# Patient Record
Sex: FEMALE | Race: WHITE | NOT HISPANIC OR LATINO | Employment: OTHER
[De-identification: names, ages, dates, MRNs, and addresses within clinical notes are randomized per-mention and may not be internally consistent; named-entity substitution may affect disease eponyms.]

---

## 2017-06-24 ENCOUNTER — HEALTH MAINTENANCE LETTER (OUTPATIENT)
Age: 62
End: 2017-06-24

## 2018-01-25 ENCOUNTER — DOCUMENTATION ONLY (OUTPATIENT)
Dept: FAMILY MEDICINE | Facility: OTHER | Age: 63
End: 2018-01-25

## 2018-01-25 RX ORDER — HYDROCODONE BITARTRATE AND ACETAMINOPHEN 5; 325 MG/1; MG/1
1 TABLET ORAL EVERY 4 HOURS PRN
COMMUNITY
Start: 2016-09-15 | End: 2018-05-31

## 2018-01-25 RX ORDER — METHYLPREDNISOLONE 4 MG
TABLET, DOSE PACK ORAL
COMMUNITY
Start: 2016-09-13 | End: 2018-05-31

## 2018-01-25 RX ORDER — ESTRADIOL 0.1 MG/G
CREAM VAGINAL AT BEDTIME
COMMUNITY
Start: 2015-07-27 | End: 2018-05-31

## 2018-01-25 RX ORDER — ALPRAZOLAM 0.5 MG
TABLET ORAL
COMMUNITY
Start: 2016-09-15 | End: 2018-05-31

## 2018-01-25 RX ORDER — ONDANSETRON 4 MG/1
4 TABLET, ORALLY DISINTEGRATING ORAL EVERY 6 HOURS PRN
COMMUNITY
Start: 2016-09-13 | End: 2018-05-31

## 2018-05-31 ENCOUNTER — OFFICE VISIT (OUTPATIENT)
Dept: FAMILY MEDICINE | Facility: OTHER | Age: 63
End: 2018-05-31
Attending: FAMILY MEDICINE
Payer: COMMERCIAL

## 2018-05-31 VITALS
OXYGEN SATURATION: 98 % | HEIGHT: 61 IN | TEMPERATURE: 98.2 F | HEART RATE: 82 BPM | BODY MASS INDEX: 22.98 KG/M2 | SYSTOLIC BLOOD PRESSURE: 128 MMHG | DIASTOLIC BLOOD PRESSURE: 78 MMHG | WEIGHT: 121.7 LBS

## 2018-05-31 DIAGNOSIS — R05.9 COUGH: Primary | ICD-10-CM

## 2018-05-31 PROCEDURE — 99213 OFFICE O/P EST LOW 20 MIN: CPT | Performed by: FAMILY MEDICINE

## 2018-05-31 RX ORDER — AZITHROMYCIN 250 MG/1
TABLET, FILM COATED ORAL
Qty: 6 TABLET | Refills: 0 | Status: SHIPPED | OUTPATIENT
Start: 2018-05-31 | End: 2020-09-24

## 2018-05-31 ASSESSMENT — PAIN SCALES - GENERAL: PAINLEVEL: NO PAIN (0)

## 2018-05-31 NOTE — NURSING NOTE
COUGH/CONGESTION  Onset:  sunday  Fever:  Feeling of low grade  Productive:  no  Shortness of breath:  no  Chills:  no  Pain scale:   no   Activity Level:  low  Appetite:  low  Able to sleep:  yes  Alisa Mancuso LPN .............5/31/2018  11:03 AM

## 2018-05-31 NOTE — PROGRESS NOTES
"SUBJECTIVE:  Jenae Jones is a 63 year old female here for productive cough and sore throat.  This has been present for 7 days.  She has a grandchild coming any day now.  She has felt feverish.  No rhinorrhea, ear pain, diarrhea, rash.  No sick contacts.  Has been using OTC meds without any help.    Allergies:  Allergies   Allergen Reactions     No Clinical Screening - See Comments      Decongestants  Dial soap-hives       ROS:    As above otherwise ROS is unremarkable.    OBJECTIVE:  /78 (BP Location: Left arm, Patient Position: Sitting, Cuff Size: Adult Regular)  Pulse 82  Temp 98.2  F (36.8  C) (Tympanic)  Ht 5' 0.63\" (1.54 m)  Wt 121 lb 11.2 oz (55.2 kg)  SpO2 98%  Breastfeeding? No  BMI 23.28 kg/m2    EXAM:  General Appearance: Pleasant, alert, appropriate appearance for age. No acute distress  Head: Normal. Normocephalic, atraumatic.  Eyes: PERRL, EOMI  Ears: Normal TM's bilaterally. Normal auditory canals and external ears.   OroPharynx: Dental hygiene adequate. Normal buccal mucosa. Normal pharynx.  Neck: Supple, no masses or nodes, no lymphadenopathy.  No thyromegaly.  Lungs: Normal chest wall and respirations. Clear to auscultation, no wheezes or crackles.  Skin: no concerning or new rashes.    ASSESSEMENT AND PLAN:    1. Cough      Likely viral but with her upcoming grandchild will treat with azithromycin.  Discussed this is not standard and she is ok with that.  Discussed conservative care.  Follow up if worsening.    Andrew Wilkerson MD    This document was prepared using voice generated software.  While every attempt was made for accuracy, grammatical errors may exist.  "

## 2018-05-31 NOTE — MR AVS SNAPSHOT
"              After Visit Summary   5/31/2018    Jenae Jones    MRN: [id number]           Patient Information     Date Of Birth          1955        Visit Information        Provider Department      5/31/2018 11:15 AM Panfilo Wilkerson MD [facility]        Today's Diagnoses     Cough    -  1       Follow-ups after your visit        Who to contact     If you have questions or need follow up information about today's clinic visit or your schedule please contact [facility] [place] directly at [phone].  Normal or non-critical lab and imaging results will be communicated to you by Monet Software[employer], letter or phone within 4 business days after the clinic has received the results. If you do not hear from us within 7 days, please contact the clinic through BlackbookHR or phone. If you have a critical or abnormal lab result, we will notify you by phone as soon as possible.  Submit refill requests through BlackbookHR or call your pharmacy and they will forward the refill request to us. Please allow 3 business days for your refill to be completed.          Additional Information About Your Visit        MyChart Information     Blackbook[employer] gives you secure access to your electronic health record. If you see a primary care provider, you can also send messages to your care team and make appointments. If you have questions, please call your primary care clinic.  If you do not have a primary care provider, please call [phone] and they will assist you.        Care EveryWhere ID     This is your Care EveryWhere ID. This could be used by other organizations to access your [facility] medical records  [id number]        Your Vitals Were     Pulse Temperature Height Pulse Oximetry Breastfeeding? BMI (Body Mass Index)    82 98.2  F (36.8  C) (Tympanic) 5' 0.63\" (1.54 m) 98% No 23.28 kg/m2       Blood Pressure from Last 3 Encounters:   05/31/18 128/78   09/13/16 145/89   08/13/14 122/78    Weight from Last 3 " Encounters:   05/31/18 121 lb 11.2 oz (55.2 kg)   09/13/16 115 lb (52.2 kg)   08/13/14 118 lb 2 oz (53.6 kg)              Today, you had the following     No orders found for display         Today's Medication Changes          These changes are accurate as of 5/31/18 11:18 AM.  If you have any questions, ask your nurse or doctor.               Start taking these medicines.        Dose/Directions    azithromycin 250 MG tablet   Commonly known as:  ZITHROMAX   Used for:  Cough   Started by:  Panfilo Wilkerson MD        Two tablets first day, then one tablet daily for four days.   Quantity:  6 tablet   Refills:  0            Where to get your medicines      These medications were sent to [facility] [address] [place], [place] - [address], [facility] [address]     Phone:  [phone]     azithromycin 250 MG tablet                Primary Care Provider Office Phone # Fax #    Cecil Lindquist -[phone]       [address] [address]        Equal Access to Services     [facility] AH: Hadii sandrita ku hadasho Soomaali, waaxda luqadaha, qaybta kaalmada adeegyada, sonia almodovar . So [facility] [phone].    ATENCIÓN: Si habla español, tiene a spring disposición servicios gratuitos de asistencia lingüística. [facility] [phone].    We comply with applicable federal civil rights laws and [place] laws. We do not discriminate on the basis of race, color, national origin, age, disability, sex, sexual orientation, or gender identity.            Thank you!     Thank you for choosing [facility] AND [facility]  for your care. Our goal is always to provide you with excellent care. Hearing back from our patients is one way we can continue to improve our services. Please take a few minutes to complete the written survey that you may receive in the mail after your visit with us. Thank you!              Your Updated Medication List - Protect others around you: Learn how to safely use, store and throw away your medicines at www.disposemymeds.org.          This list is accurate as of 5/31/18 11:18 AM.  Always use your most recent med list.                   Brand Name Dispense Instructions for use Diagnosis    aspirin-acetaminophen-caffeine 250-250-65 MG per tablet    EXCEDRIN MIGRAINE     Take 1 tablet by mouth every 6 hours as needed For headache. Max acetaminophen dose: 4000 mg in 24 hours.        azithromycin 250 MG tablet    ZITHROMAX    6 tablet    Two tablets first day, then one tablet daily for four days.    Cough

## 2018-06-11 ENCOUNTER — OFFICE VISIT (OUTPATIENT)
Dept: FAMILY MEDICINE | Facility: OTHER | Age: 63
End: 2018-06-11
Attending: NURSE PRACTITIONER
Payer: COMMERCIAL

## 2018-06-11 ENCOUNTER — HOSPITAL ENCOUNTER (OUTPATIENT)
Dept: GENERAL RADIOLOGY | Facility: OTHER | Age: 63
Discharge: HOME | End: 2018-06-11
Attending: NURSE PRACTITIONER | Admitting: NURSE PRACTITIONER
Payer: COMMERCIAL

## 2018-06-11 VITALS
BODY MASS INDEX: 24.03 KG/M2 | HEIGHT: 60 IN | WEIGHT: 122.4 LBS | HEART RATE: 80 BPM | DIASTOLIC BLOOD PRESSURE: 80 MMHG | SYSTOLIC BLOOD PRESSURE: 102 MMHG

## 2018-06-11 DIAGNOSIS — R09.89 CHEST CONGESTION: ICD-10-CM

## 2018-06-11 DIAGNOSIS — G93.31 POST VIRAL SYNDROME: ICD-10-CM

## 2018-06-11 DIAGNOSIS — R09.89 CHEST CONGESTION: Primary | ICD-10-CM

## 2018-06-11 PROCEDURE — 99213 OFFICE O/P EST LOW 20 MIN: CPT | Performed by: NURSE PRACTITIONER

## 2018-06-11 PROCEDURE — 71046 X-RAY EXAM CHEST 2 VIEWS: CPT

## 2018-06-11 ASSESSMENT — ANXIETY QUESTIONNAIRES
1. FEELING NERVOUS, ANXIOUS, OR ON EDGE: NOT AT ALL
2. NOT BEING ABLE TO STOP OR CONTROL WORRYING: NOT AT ALL

## 2018-06-11 ASSESSMENT — PAIN SCALES - GENERAL: PAINLEVEL: NO PAIN (1)

## 2018-06-11 NOTE — NURSING NOTE
Patient presents to the clinic for a follow up regarding a cold. Patient states she came in 3 weeks ago and was put on antibiotics and it did not help.  Julio C Benavides ..............6/11/2018 11:04 AM

## 2018-06-11 NOTE — PATIENT INSTRUCTIONS
ASSESSMENT/PLAN:     1. Chest congestion  - XR Chest 2 Views; Future    2. Post viral syndrome  - Discussed that although azithromycin is only 5 days it does continue working in the body for another 5 days.   - Exam and CXR not indicative of treatment with antibiotics.   -- Symptomatic treatments recommended.  - Ensure you are staying hydrated by drinking plenty of fluids or eating foods such as popsicles, jello, pudding.  - Honey and Salt water gurgles can help soothe sore throat  - Rest  - Humidifier can help with congestion and help keep mucus membranes such as throat and nose from drying out.  - Sleeping slightly propped up can help with congestion and postnasal drainage that can worsen cough at bedtime.  - As long as you have never been told to take Tylenol and/or Ibuprofen you can use them to manage fever and body aches per package instructions  Make sure you eat when you take ibuprofen to avoid stomach upset.  - OTC cough medications per package instructions to help with cough. Check to see if the cough/cold medication already has acetaminophen (Tylenol) in it. If it does avoid taking additional Tylenol.  - Can try OTC antihistamine such as Zyrtec, Allegra, Claritin (generic okay) to help with congestion, sinus pressure. Benadryl as needed at night time.   - Can continue with Mucinex to help thin secretions.         FUTURE APPOINTMENTS:       - Follow-up visit - If sudden onset of new fever, worsening symptoms return for further evaluation.    Anum Rodriguez, CNP  [facility] AND [facility]

## 2018-06-11 NOTE — MR AVS SNAPSHOT
After Visit Summary   6/11/2018    Jenae Jones    MRN: [id number]           Patient Information     Date Of Birth          1955        Visit Information        Provider Department      6/11/2018 11:00 AM Anum Rodriguez CNP [facility]        Today's Diagnoses     Chest congestion    -  1    Post viral syndrome          Care Instructions    ASSESSMENT/PLAN:     1. Chest congestion  - XR Chest 2 Views; Future    2. Post viral syndrome  - Discussed that although azithromycin is only 5 days it does continue working in the body for another 5 days.   - Exam and CXR not indicative of treatment with antibiotics.   -- Symptomatic treatments recommended.  - Ensure you are staying hydrated by drinking plenty of fluids or eating foods such as popsicles, jello, pudding.  - Honey and Salt water gurgles can help soothe sore throat  - Rest  - Humidifier can help with congestion and help keep mucus membranes such as throat and nose from drying out.  - Sleeping slightly propped up can help with congestion and postnasal drainage that can worsen cough at bedtime.  - As long as you have never been told to take Tylenol and/or Ibuprofen you can use them to manage fever and body aches per package instructions  Make sure you eat when you take ibuprofen to avoid stomach upset.  - OTC cough medications per package instructions to help with cough. Check to see if the cough/cold medication already has acetaminophen (Tylenol) in it. If it does avoid taking additional Tylenol.  - Can try OTC antihistamine such as Zyrtec, Allegra, Claritin (generic okay) to help with congestion, sinus pressure. Benadryl as needed at night time.   - Can continue with Mucinex to help thin secretions.         FUTURE APPOINTMENTS:       - Follow-up visit - If sudden onset of new fever, worsening symptoms return for further evaluation.    Anum Rodriguez CNP  [facility] AND hospitals            Follow-ups after  your visit        Future tests that were ordered for you today     Open Future Orders        Priority Expected Expires Ordered    XR Chest 2 Views Routine 6/11/2018 6/11/2019 6/11/2018            Who to contact     If you have questions or need follow up information about today's clinic visit or your schedule please contact [facility] directly at [phone].  Normal or non-critical lab and imaging results will be communicated to you by MerchMehart, letter or phone within 4 business days after the clinic has received the results. If you do not hear from us within 7 days, please contact the clinic through MerchMehart or phone. If you have a critical or abnormal lab result, we will notify you by phone as soon as possible.  Submit refill requests through [employer] or call your pharmacy and they will forward the refill request to us. Please allow 3 business days for your refill to be completed.          Additional Information About Your Visit        MerchMehart Information     [employer] gives you secure access to your electronic health record. If you see a primary care provider, you can also send messages to your care team and make appointments. If you have questions, please call your primary care clinic.  If you do not have a primary care provider, please call [phone] and they will assist you.        Care EveryWhere ID     This is your Care EveryWhere ID. This could be used by other organizations to access your [facility] medical records  [id number]        Your Vitals Were     Pulse Height BMI (Body Mass Index)             80 5' (1.524 m) 23.9 kg/m2          Blood Pressure from Last 3 Encounters:   06/11/18 102/80   05/31/18 128/78   09/13/16 145/89    Weight from Last 3 Encounters:   06/11/18 122 lb 6.4 oz (55.5 kg)   05/31/18 121 lb 11.2 oz (55.2 kg)   09/13/16 115 lb (52.2 kg)               Primary Care Provider Office Phone # Fax #    Cecil Lindquist [phone]       [employer]  [place] [address] [facility] [address]        Equal Access to Services     KARSONMIMA MAXWELL : Hadii aad ku hadhui Palomares, wagopalda luqadaha, qaybta kaestrellada carrollkimberlyani, waxnini shayy jessilvina sotomayorjoetoyin olsen. So [facility] [phone].    ATENCIÓN: Si habla español, tiene a spring disposición servicios gratuitos de asistencia lingüística. Llame al [phone].    We comply with applicable federal civil rights laws and [place] laws. We do not discriminate on the basis of race, color, national origin, age, disability, sex, sexual orientation, or gender identity.            Thank you!     Thank you for choosing [facility] AND [facility]  for your care. Our goal is always to provide you with excellent care. Hearing back from our patients is one way we can continue to improve our services. Please take a few minutes to complete the written survey that you may receive in the mail after your visit with us. Thank you!             Your Updated Medication List - Protect others around you: Learn how to safely use, store and throw away your medicines at www.disposemymeds.org.          This list is accurate as of 6/11/18 11:39 AM.  Always use your most recent med list.                   Brand Name Dispense Instructions for use Diagnosis    aspirin-acetaminophen-caffeine 250-250-65 MG per tablet    EXCEDRIN MIGRAINE     Take 1 tablet by mouth every 6 hours as needed For headache. Max acetaminophen dose: 4000 mg in 24 hours.        azithromycin 250 MG tablet    ZITHROMAX    6 tablet    Two tablets first day, then one tablet daily for four days.    Cough

## 2018-06-11 NOTE — PROGRESS NOTES
"  SUBJECTIVE:   Jenae Jones is a 63 year old female who presents to clinic today for the following health issues:    Acute Illness   Acute illness concerns: Continues to have cold-like symptoms  Onset: about 2 weeks    Fever: no    Chills/Sweats: no    Headache (location?): no    Sinus Pressure:YES- started today    Conjunctivitis:  no    Ear Pain: YES: bilateral- started today    Rhinorrhea: YES- clear    Congestion: YES    Sore Throat: YES- scratchy     Cough: YES - \"a little bit\"    Wheeze: no    Decreased Appetite: no    Nausea: no    Vomiting: no    Diarrhea:  no    Dysuria/Freq.: no    Fatigue/Achiness: Yes    Sick/Strep Exposure: no     Therapies Tried and outcome: Robitussin helps for a few hours.     Was started on a z-pack at 2018 appointment due to arrival of her granddaughter. This has not helped.    Problem list and histories reviewed & adjusted, as indicated.  Additional history: as documented    Patient Active Problem List   Diagnosis     Facial pain     GERD (gastroesophageal reflux disease)     Hyperlipidemia, mixed     Menopause present     Mouth pain     Osteopenia     Palpitations     Routine health maintenance     Past Surgical History:   Procedure Laterality Date      SECTION      No Comments Provided     COLONOSCOPY      2007,normal;  Dr. Go outside facility     EXCISE CYST GENERIC (LOCATION)      ,endocervial region polyp     OTHER SURGICAL HISTORY      96481.0,SKIN/SUBCUTANEOUS SURGERY,3 birthmarks removed from neck.     OTHER SURGICAL HISTORY      [id number],SKIN BIOPSY,removal in [place]       Social History   Substance Use Topics     Smoking status: Former Smoker     Quit date: 1979     Smokeless tobacco: Never Used     Alcohol use 0.0 - 0.5 oz/week      Comment: socially     Family History   Problem Relation Age of Onset     CANCER Father      Cancer,leukemia     Other - See Comments Mother 70     Parkinson's/Psychiatric illness,Depression     Other - See " Comments Brother 50     Antoine's     Other - See Comments Brother      Psychiatric illness,Depression     Other - See Comments Daughter      Psychiatric illness,Depression     Breast Cancer No family hx of      Cancer-breast         Current Outpatient Prescriptions   Medication Sig Dispense Refill     aspirin-acetaminophen-caffeine (EXCEDRIN MIGRAINE) 250-250-65 MG per tablet Take 1 tablet by mouth every 6 hours as needed For headache. Max acetaminophen dose: 4000 mg in 24 hours.       azithromycin (ZITHROMAX) 250 MG tablet Two tablets first day, then one tablet daily for four days. 6 tablet 0     Allergies   Allergen Reactions     No Clinical Screening - See Comments      Decongestants  Dial soap-hives     Recent Labs   Lab Test  09/13/16   1125  08/14/14   0959  06/26/13   1636   LDL   --   145*   --    HDL   --   66   --    TRIG   --   69   --    CR  0.62   --   0.67   GFRESTIMATED  >90  Non  GFR Calc     --    --    GFRESTBLACK  >90   GFR Calc     --   >60   POTASSIUM  3.5   --   4.4      BP Readings from Last 3 Encounters:   06/11/18 102/80   05/31/18 128/78   09/13/16 145/89    Wt Readings from Last 3 Encounters:   06/11/18 122 lb 6.4 oz (55.5 kg)   05/31/18 121 lb 11.2 oz (55.2 kg)   09/13/16 115 lb (52.2 kg)           Reviewed and updated as needed this visit by clinical staff  Tobacco  Allergies  Meds  Med Hx  Surg Hx  Fam Hx  Soc Hx      Reviewed and updated as needed this visit by Provider         ROS:  Constitutional, HEENT, cardiovascular, pulmonary, gi and gu systems are negative, except as otherwise noted.    OBJECTIVE:     /80 (BP Location: Right arm, Patient Position: Sitting, Cuff Size: Adult Large)  Pulse 80  Ht 5' (1.524 m)  Wt 122 lb 6.4 oz (55.5 kg)  BMI 23.9 kg/m2  Body mass index is 23.9 kg/(m^2).     GENERAL: healthy, alert and no distress  EYES: Eyes grossly normal to inspection, PERRL and conjunctivae and sclerae normal  HENT: ear canals  normal, clear fluid behind both TMs: no erythema or exudate, nose and mouth without ulcers or lesions. congested  NECK: no adenopathy, no asymmetry, masses, or scars and thyroid normal to palpation  RESP: lungs clear to auscultation - no rales, rhonchi or wheezes  CV: regular rate and rhythm, normal S1 S2, no S3 or S4, no murmur, click or rub  NEURO: Normal strength and tone, mentation intact and speech normal  PSYCH: mentation appears normal, affect normal/bright      ASSESSMENT/PLAN:     1. Chest congestion  - XR Chest 2 Views; Future: Normal results. Has calcified granuloma in left apex which she had in CXR from 2016 as well.    2. Post viral syndrome  - Discussed that although azithromycin is only 5 days it does continue working in the body for another 5 days.   - Exam and CXR not indicative of treatment with antibiotics.   -- Symptomatic treatments recommended.  - Ensure you are staying hydrated by drinking plenty of fluids or eating foods such as popsicles, jello, pudding.  - Honey and Salt water gurgles can help soothe sore throat  - Rest  - Humidifier can help with congestion and help keep mucus membranes such as throat and nose from drying out.  - Sleeping slightly propped up can help with congestion and postnasal drainage that can worsen cough at bedtime.  - As long as you have never been told to take Tylenol and/or Ibuprofen you can use them to manage fever and body aches per package instructions  Make sure you eat when you take ibuprofen to avoid stomach upset.  - OTC cough medications per package instructions to help with cough. Check to see if the cough/cold medication already has acetaminophen (Tylenol) in it. If it does avoid taking additional Tylenol.  - Can try OTC antihistamine such as Zyrtec, Allegra, Claritin (generic okay) to help with congestion, sinus pressure. Benadryl as needed at night time.   - Can continue with Mucinex to help thin secretions.         FUTURE APPOINTMENTS:       -  Follow-up visit - If sudden onset of new fever, worsening symptoms return for further evaluation.    Anum Rodriguez CNP  [facility] AND [facility]

## 2019-08-12 ENCOUNTER — HOSPITAL ENCOUNTER (OUTPATIENT)
Dept: BONE DENSITY | Facility: OTHER | Age: 64
Discharge: HOME | End: 2019-08-12
Attending: FAMILY MEDICINE | Admitting: FAMILY MEDICINE
Payer: COMMERCIAL

## 2019-08-12 ENCOUNTER — HOSPITAL ENCOUNTER (OUTPATIENT)
Dept: MAMMOGRAPHY | Facility: OTHER | Age: 64
End: 2019-08-12
Attending: FAMILY MEDICINE
Payer: COMMERCIAL

## 2019-08-12 DIAGNOSIS — M85.80 OSTEOPENIA: ICD-10-CM

## 2019-08-12 DIAGNOSIS — Z12.39 BREAST SCREENING, UNSPECIFIED: ICD-10-CM

## 2019-08-12 PROCEDURE — 77063 BREAST TOMOSYNTHESIS BI: CPT

## 2019-08-12 PROCEDURE — 77080 DXA BONE DENSITY AXIAL: CPT

## 2019-08-13 ENCOUNTER — HOSPITAL ENCOUNTER (OUTPATIENT)
Dept: MAMMOGRAPHY | Facility: OTHER | Age: 64
End: 2019-08-13
Attending: FAMILY MEDICINE
Payer: COMMERCIAL

## 2019-08-13 DIAGNOSIS — R92.8 ABNORMAL MAMMOGRAM: ICD-10-CM

## 2019-08-13 PROCEDURE — G0279 TOMOSYNTHESIS, MAMMO: HCPCS

## 2019-10-02 ENCOUNTER — HEALTH MAINTENANCE LETTER (OUTPATIENT)
Age: 64
End: 2019-10-02

## 2020-03-22 ENCOUNTER — HEALTH MAINTENANCE LETTER (OUTPATIENT)
Age: 65
End: 2020-03-22

## 2020-08-31 ENCOUNTER — OFFICE VISIT (OUTPATIENT)
Dept: FAMILY MEDICINE | Facility: OTHER | Age: 65
End: 2020-08-31
Attending: FAMILY MEDICINE
Payer: COMMERCIAL

## 2020-08-31 ENCOUNTER — HOSPITAL ENCOUNTER (OUTPATIENT)
Dept: GENERAL RADIOLOGY | Facility: OTHER | Age: 65
End: 2020-08-31
Attending: FAMILY MEDICINE
Payer: COMMERCIAL

## 2020-08-31 VITALS
OXYGEN SATURATION: 98 % | HEART RATE: 70 BPM | RESPIRATION RATE: 16 BRPM | SYSTOLIC BLOOD PRESSURE: 120 MMHG | WEIGHT: 128.6 LBS | DIASTOLIC BLOOD PRESSURE: 82 MMHG | TEMPERATURE: 98.5 F | BODY MASS INDEX: 25.12 KG/M2

## 2020-08-31 DIAGNOSIS — F41.0 PANIC ATTACK: ICD-10-CM

## 2020-08-31 DIAGNOSIS — Z12.11 SCREEN FOR COLON CANCER: ICD-10-CM

## 2020-08-31 DIAGNOSIS — M25.512 ACUTE PAIN OF LEFT SHOULDER: ICD-10-CM

## 2020-08-31 DIAGNOSIS — M75.82 ROTATOR CUFF TENDONITIS, LEFT: Primary | ICD-10-CM

## 2020-08-31 DIAGNOSIS — Z12.31 ENCOUNTER FOR SCREENING MAMMOGRAM FOR BREAST CANCER: ICD-10-CM

## 2020-08-31 PROCEDURE — 73030 X-RAY EXAM OF SHOULDER: CPT | Mod: LT

## 2020-08-31 PROCEDURE — G0463 HOSPITAL OUTPT CLINIC VISIT: HCPCS

## 2020-08-31 PROCEDURE — G0439 PPPS, SUBSEQ VISIT: HCPCS | Performed by: FAMILY MEDICINE

## 2020-08-31 RX ORDER — ALPRAZOLAM 0.5 MG
0.5 TABLET ORAL DAILY PRN
Qty: 6 TABLET | Refills: 0 | Status: SHIPPED | OUTPATIENT
Start: 2020-08-31 | End: 2020-09-06

## 2020-08-31 ASSESSMENT — PAIN SCALES - GENERAL: PAINLEVEL: EXTREME PAIN (9)

## 2020-08-31 NOTE — NURSING NOTE
Chief Complaint   Patient presents with     Pain     L shoulder      L shoulder pain for 3 weeks. Was grocery shopping and was going to lift something and instant pain. Minimal relief with advil and ice. Very poor ROM.     Medication Reconciliation: complete    Katelyn Joseph, LPN

## 2020-08-31 NOTE — LETTER
September 25, 2020      Jenae Jones  [address] [facility] [address]        Dear ,    We are writing to inform you of your test results.      Resulted Orders   ABSTRACT COLOGUARD-NO CHARGE   Result Value Ref Range    COLOGUARD-ABSTRACT Negative        If you have any questions or concerns, please call the clinic at the number listed above.       Sincerely,        Fauzia Estrada MD

## 2020-08-31 NOTE — PROGRESS NOTES
Nursing Notes:   Katelyn Joseph LPN  8/31/2020 11:19 AM  Signed  Chief Complaint   Patient presents with     Pain     L shoulder      L shoulder pain for 3 weeks. Was grocery shopping and was going to lift something and instant pain. Minimal relief with advil and ice. Very poor ROM.     Medication Reconciliation: complete    Katelyn Joseph LPN       SUBJECTIVE:  HPI:Jenae Jones is a 65 year old female here for acute left shoulder pain for the past 3 weeks.  She first noticed pain when she was at the grocery store reaching for an item and she heard and felt a small popping.  Since then she notes pain with movement which has been worse at night.  She rates pain 8/10 with movement.  She has been taking Advil 200 mg once or twice daily which has helped only a little.  She denies any surgeries or injuries to this shoulder previously.    She would also like to establish care at [facility] and transfer her care from the [facility], in order to be closer to home.  She is due for her mammogram and colonoscopy, which I will order today.  She states she is up-to-date on her shingles vaccine which we will update in our system to reflect that.  She will return to clinic for her annual exam and she will check when that is due.    Lastly, she reports that she has never had a history of depression and anxiety, but recently with the protests going on and police violence and COVID, she finds herself having panic attacks.  Her son is a  in the [place] and she is often worried about him.  She denies any alcohol or drug use in the past.  She was prescribed a daily SSRI by her PCP in the [facility], but feels no symptoms of depression and prefers not to take medication daily unless it is truly indicated.    Allergies:  Allergies   Allergen Reactions     No Clinical Screening - See Comments      Decongestants  Dial soap-hives     ROS:  Constitutional, HEENT, cardiovascular, pulmonary, GI and  systems are negative,  except as otherwise noted.    Past medical, surgical, and family history reviewed and updated as appropriate in the chart.  Relevant social history listed in HPI.    OBJECTIVE:  /82 (BP Location: Right arm, Patient Position: Sitting, Cuff Size: Adult Regular)   Pulse 70   Temp 98.5  F (36.9  C) (Tympanic)   Resp 16   Wt 58.3 kg (128 lb 9.6 oz)   SpO2 98%   Breastfeeding No   BMI 25.12 kg/m      EXAM:  Constitutional: Alert and oriented. No acute distress. Well-groomed, well-hydrated and well-nourished.  Appears stated age.  Head: Normocephalic, atraumatic.  Eyes: PERRL, EOMI  Shoulder exam: Left shoulder-no bony deformities.  Painful to palpation along supraspinatus and teres minor in addition to the biceps insertion anteriorly.  Limited range of motion with posterior extension.  AB duction is preserved. Rotator cuff/supraspinatous 4/5 strength.  With resisted external rotation has some mild pain in the right posterior shoulder/supraspinatous area.  Left shoulder is unremarkable.  Skin: Warm, dry, intact.  No concerning rashes or lesions.  Neurologic: CN 2-12 grossly intact. Normal gait. No focal motor or sensory deficits. No tremor.  Psychiatric: Does not appear anxious or depressed.  Appropriate affect and insight.    X-ray of left shoulder personally reviewed.  FINDINGS:  No fracture or dislocation is identified. Mild degenerative  changes are seen at the acromioclavicular jointCalcified nodules are  seen in the left upper lobe. There is at least one calcified left  hilar lymph node. These are almost certainly benign.    Left upper lung calcification has not changed since June 2018.    ASSESSEMENT AND PLAN:    1. Rotator cuff tendonitis, left  2. Acute pain of left shoulder  - PHYSICAL THERAPY REFERRAL; Future  -Patient is in physical therapy at [facility] and would like to return there for PT.  -Increased dose of Advil/ibuprofen to a max of 800 mg every 8 hours.  -Encouraged ice and frequent gentle  shoulder movement to prevent frozen shoulder.  -Return to clinic if no improvement with physical therapy after 6 weeks.  I would consider MRI imaging at that point.    3. Encounter for screening mammogram for breast cancer  - MA Screen Bilateral w/Randy; Future    4. Screen for colon cancer  - ABSTRACT COLOGUARD-NO CHARGE    5. Panic attack  -Due to recent stress, we discussed a trial of a short acting benzodiazepine, which she can break in half if needed.  -Patient denies history of alcohol abuse or drug abuse.  -If symptoms worsen or persist, we discussed a trial of a daily SSRI.  - ALPRAZolam (XANAX) 0.5 MG tablet; Take 1 tablet (0.5 mg) by mouth daily as needed for anxiety  Dispense: 6 tablet; Refill: 0    Return to clinic for annual wellness exam.    Fauzia Estrada MD  [facility] AND [facility]

## 2020-08-31 NOTE — PATIENT INSTRUCTIONS
Patient Education     Understanding Rotator Cuff Tendonitis    Tendons are tough tissues that connect muscles to bone. A group of 4 muscles and their tendons form a  cuff  around the head of the upper arm bone. This is called the rotator cuff. It connects the upper arm to the shoulder blade. It gives the shoulder joint stability and strength.  If tendons are injured or strained, they may become irritated and swollen (inflamed). This is called tendonitis. Rotator cuff tendonitis may cause shoulder pain and loss of function.  What causes rotator cuff tendonitis?  Tendonitis results when the rotator cuff tendons are injured or overworked. The most common cause of injury is repetitive overhead activities. These can be work-related activities such as reaching, pushing, or lifting. Or they can be sports-related activities such as throwing, swimming, or lifting weights.  Symptoms of rotator cuff tendonitis  Pain on the side of the upper arm is the most common symptom. Pain may get worse with overhead movements or when you raise the arm above shoulder level. It may also hurt to lie on the shoulder at night.  Treatment for rotator cuff tendonitis  Treatment may include the following:    Active rest. This lets the rotator cuff heal. Active rest means using your arm and shoulder, but avoiding activities that cause pain, such as reaching overhead or sleeping on the shoulder.    Cold packs. Putting ice packs on the shoulder helps reduce swelling and relieve pain.    Pain medicines. Prescription or over-the-counter pain medicines can help relieve pain and swelling.    Arm and shoulder exercises. These help keep the shoulder joint mobile as it heals. They also help improve the strength of muscles around the joint.  Possible complications  It might be tempting to stop using your shoulder completely to avoid pain. But doing so may lead to a condition called  frozen shoulder.  To help prevent this, following instructions you are  given for active rest and for doing exercises to help your shoulder heal.  When to call your healthcare provider  Call your healthcare provider right away if you have any of these:    Fever of 100.4 F (38 C) or higher, or as directed    Symptoms that don t get better, or get worse    New symptoms   Date Last Reviewed: 3/10/2016    8806-0695 [employer]. [address] [place] [address]. All rights reserved. This information is not intended as a substitute for professional medical care. Always follow your healthcare professional's instructions.

## 2020-09-17 LAB — COLOGUARD-ABSTRACT: NEGATIVE

## 2020-09-24 ENCOUNTER — MYC MEDICAL ADVICE (OUTPATIENT)
Dept: FAMILY MEDICINE | Facility: OTHER | Age: 65
End: 2020-09-24

## 2020-09-24 DIAGNOSIS — M75.82 ROTATOR CUFF TENDONITIS, LEFT: Primary | ICD-10-CM

## 2020-09-24 DIAGNOSIS — M25.512 ACUTE PAIN OF LEFT SHOULDER: ICD-10-CM

## 2020-09-30 ENCOUNTER — TRANSFERRED RECORDS (OUTPATIENT)
Dept: HEALTH INFORMATION MANAGEMENT | Facility: OTHER | Age: 65
End: 2020-09-30

## 2020-10-01 ENCOUNTER — TELEPHONE (OUTPATIENT)
Dept: FAMILY MEDICINE | Facility: OTHER | Age: 65
End: 2020-10-01

## 2020-10-02 ENCOUNTER — MYC MEDICAL ADVICE (OUTPATIENT)
Dept: FAMILY MEDICINE | Facility: OTHER | Age: 65
End: 2020-10-02

## 2020-10-05 ENCOUNTER — TELEPHONE (OUTPATIENT)
Dept: FAMILY MEDICINE | Facility: OTHER | Age: 65
End: 2020-10-05

## 2020-10-05 DIAGNOSIS — M75.92 SUPRASPINATUS TENDONITIS, LEFT: ICD-10-CM

## 2020-10-05 DIAGNOSIS — M75.102 TEAR OF LEFT SUPRASPINATUS TENDON: Primary | ICD-10-CM

## 2020-10-05 RX ORDER — TRAMADOL HYDROCHLORIDE 50 MG/1
50 TABLET ORAL EVERY 12 HOURS PRN
Qty: 30 TABLET | Refills: 0 | Status: SHIPPED | OUTPATIENT
Start: 2020-10-05 | End: 2020-10-20

## 2020-10-05 NOTE — TELEPHONE ENCOUNTER
Patient called with MRI results. Partial thickness tear and tendonitis. Tramadol ordered to be used for severe pain, #30. She has also been using CBD oil and cream- risks/benefits discussed. RTC in 2-3 weeks if no improvement in pain. May need ortho referral if pain doesn't improve w/ PT. -CCN

## 2020-10-26 ENCOUNTER — MYC MEDICAL ADVICE (OUTPATIENT)
Dept: FAMILY MEDICINE | Facility: OTHER | Age: 65
End: 2020-10-26

## 2020-10-28 NOTE — TELEPHONE ENCOUNTER
MRI is in the chart under media. I'm not sure what she sees on her end in her mychart. Please ask Anum Mcintosh for help with this as I have tried to address.    -CCN

## 2020-11-09 ENCOUNTER — HOSPITAL ENCOUNTER (OUTPATIENT)
Dept: MAMMOGRAPHY | Facility: OTHER | Age: 65
Discharge: HOME | End: 2020-11-09
Attending: FAMILY MEDICINE | Admitting: FAMILY MEDICINE
Payer: COMMERCIAL

## 2020-11-09 DIAGNOSIS — Z12.31 ENCOUNTER FOR SCREENING MAMMOGRAM FOR BREAST CANCER: ICD-10-CM

## 2020-11-09 PROCEDURE — 77067 SCR MAMMO BI INCL CAD: CPT

## 2021-09-04 ENCOUNTER — HEALTH MAINTENANCE LETTER (OUTPATIENT)
Age: 66
End: 2021-09-04

## 2022-05-25 ENCOUNTER — OFFICE VISIT (OUTPATIENT)
Dept: FAMILY MEDICINE | Facility: OTHER | Age: 67
End: 2022-05-25
Attending: FAMILY MEDICINE
Payer: COMMERCIAL

## 2022-05-25 VITALS
TEMPERATURE: 97.6 F | SYSTOLIC BLOOD PRESSURE: 122 MMHG | HEART RATE: 85 BPM | WEIGHT: 130.8 LBS | DIASTOLIC BLOOD PRESSURE: 62 MMHG | OXYGEN SATURATION: 100 % | BODY MASS INDEX: 24.07 KG/M2 | RESPIRATION RATE: 16 BRPM | HEIGHT: 62 IN

## 2022-05-25 DIAGNOSIS — N95.2 VAGINAL ATROPHY: ICD-10-CM

## 2022-05-25 DIAGNOSIS — R73.01 IFG (IMPAIRED FASTING GLUCOSE): ICD-10-CM

## 2022-05-25 DIAGNOSIS — H90.6 MIXED HEARING LOSS, BILATERAL: ICD-10-CM

## 2022-05-25 DIAGNOSIS — Z11.59 NEED FOR HEPATITIS C SCREENING TEST: ICD-10-CM

## 2022-05-25 DIAGNOSIS — E78.5 DYSLIPIDEMIA: ICD-10-CM

## 2022-05-25 DIAGNOSIS — M81.0 OSTEOPOROSIS, UNSPECIFIED OSTEOPOROSIS TYPE, UNSPECIFIED PATHOLOGICAL FRACTURE PRESENCE: ICD-10-CM

## 2022-05-25 DIAGNOSIS — Z00.00 HEALTH CARE MAINTENANCE: Primary | ICD-10-CM

## 2022-05-25 DIAGNOSIS — C44.42 SQUAMOUS CELL CARCINOMA OF SCALP: ICD-10-CM

## 2022-05-25 DIAGNOSIS — Z12.31 ENCOUNTER FOR SCREENING MAMMOGRAM FOR BREAST CANCER: ICD-10-CM

## 2022-05-25 PROBLEM — M75.92 SUPRASPINATUS TENDONITIS, LEFT: Status: RESOLVED | Noted: 2020-10-05 | Resolved: 2022-05-25

## 2022-05-25 PROBLEM — M75.102 TEAR OF LEFT SUPRASPINATUS TENDON: Status: RESOLVED | Noted: 2020-10-05 | Resolved: 2022-05-25

## 2022-05-25 LAB
CA-I BLD-MCNC: 5 MG/DL (ref 4.4–5.2)
CHOLEST SERPL-MCNC: 224 MG/DL
DEPRECATED CALCIDIOL+CALCIFEROL SERPL-MC: 42 UG/L (ref 30–100)
FASTING STATUS PATIENT QL REPORTED: ABNORMAL
HDLC SERPL-MCNC: 84 MG/DL (ref 23–92)
LDLC SERPL CALC-MCNC: 126 MG/DL
NONHDLC SERPL-MCNC: 140 MG/DL
PTH-INTACT SERPL-MCNC: 33 PG/ML (ref 18–80)
TRIGL SERPL-MCNC: 72 MG/DL

## 2022-05-25 PROCEDURE — G0463 HOSPITAL OUTPT CLINIC VISIT: HCPCS

## 2022-05-25 PROCEDURE — 82330 ASSAY OF CALCIUM: CPT | Mod: ZL | Performed by: FAMILY MEDICINE

## 2022-05-25 PROCEDURE — G0439 PPPS, SUBSEQ VISIT: HCPCS | Performed by: FAMILY MEDICINE

## 2022-05-25 PROCEDURE — 83970 ASSAY OF PARATHORMONE: CPT | Mod: ZL | Performed by: FAMILY MEDICINE

## 2022-05-25 PROCEDURE — 82306 VITAMIN D 25 HYDROXY: CPT | Mod: ZL | Performed by: FAMILY MEDICINE

## 2022-05-25 PROCEDURE — 86803 HEPATITIS C AB TEST: CPT | Mod: ZL | Performed by: FAMILY MEDICINE

## 2022-05-25 PROCEDURE — 83036 HEMOGLOBIN GLYCOSYLATED A1C: CPT | Mod: ZL | Performed by: FAMILY MEDICINE

## 2022-05-25 PROCEDURE — 80061 LIPID PANEL: CPT | Mod: ZL | Performed by: FAMILY MEDICINE

## 2022-05-25 PROCEDURE — 36415 COLL VENOUS BLD VENIPUNCTURE: CPT | Mod: ZL | Performed by: FAMILY MEDICINE

## 2022-05-25 RX ORDER — ESTRADIOL 0.1 MG/G
CREAM VAGINAL
Qty: 42.5 G | Refills: 3 | Status: SHIPPED | OUTPATIENT
Start: 2022-05-25 | End: 2023-06-07 | Stop reason: ALTCHOICE

## 2022-05-25 RX ORDER — FLUOROURACIL 50 MG/G
CREAM TOPICAL
COMMUNITY
Start: 2022-05-20 | End: 2023-06-07

## 2022-05-25 ASSESSMENT — ENCOUNTER SYMPTOMS
FREQUENCY: 0
DYSURIA: 0
NERVOUS/ANXIOUS: 0
JOINT SWELLING: 0
NAUSEA: 0
ABDOMINAL PAIN: 0
HEADACHES: 1
CONSTIPATION: 0
SHORTNESS OF BREATH: 0
MYALGIAS: 0
DIZZINESS: 0
PALPITATIONS: 0
PARESTHESIAS: 0
SORE THROAT: 0
EYE PAIN: 0
WEAKNESS: 0
FEVER: 0
CHILLS: 0
HEMATURIA: 0
HEARTBURN: 0
HEMATOCHEZIA: 0
BREAST MASS: 0
COUGH: 0
DIARRHEA: 0
ARTHRALGIAS: 0

## 2022-05-25 ASSESSMENT — ACTIVITIES OF DAILY LIVING (ADL): CURRENT_FUNCTION: NO ASSISTANCE NEEDED

## 2022-05-25 ASSESSMENT — PAIN SCALES - GENERAL: PAINLEVEL: NO PAIN (0)

## 2022-05-25 NOTE — PROGRESS NOTES
"SUBJECTIVE:   Jenae Jones is a 67 year old female who presents for Preventive Visit.      Patient has been advised of split billing requirements and indicates understanding: Yes  Are you in the first 12 months of your Medicare coverage?  No    Healthy Habits:     In general, how would you rate your overall health?  Excellent    Frequency of exercise:  4-5 days/week    Duration of exercise:  Greater than 60 minutes    Do you usually eat at least 4 servings of fruit and vegetables a day, include whole grains    & fiber and avoid regularly eating high fat or \"junk\" foods?  Yes    Taking medications regularly:  Yes    Ability to successfully perform activities of daily living:  No assistance needed    Home Safety:  Lack of grab bars in the bathroom and no safety concerns identified    Hearing Impairment:  Feel that people are mumbling or not speaking clearly, need to ask people to speak up or repeat themselves and difficulty understanding soft or whispered speech    In the past 6 months, have you been bothered by leaking of urine?  No    In general, how would you rate your overall mental or emotional health?  Excellent      PHQ-2 Total Score: 0    Additional concerns today:  Yes    Do you feel safe in your environment? Yes    Have you ever done Advance Care Planning? (For example, a Health Directive, POLST, or a discussion with a medical provider or your loved ones about your wishes): Yes, advance care planning is on file.       Fall risk  Fallen 2 or more times in the past year?: No  Any fall with injury in the past year?: No    Cognitive Screening   1) Repeat 3 items (Leader, Season, Table)    2) Clock draw: NORMAL  3) 3 item recall: Recalls 3 objects  Results: 3 items recalled: COGNITIVE IMPAIRMENT LESS LIKELY    Mini-CogTM Copyright JAVID Boss. Licensed by the author for use in [employer]; reprinted with permission (brooke@.[email or web address]). All rights reserved.      Do you have sleep apnea, excessive snoring or " "daytime drowsiness?: no    Reviewed and updated as needed this visit by clinical staff   Tobacco  Allergies  Meds   Med Hx  Surg Hx  Fam Hx  Soc Hx          Reviewed and updated as needed this visit by Provider                   Social History     Tobacco Use     Smoking status: Former Smoker     Quit date: 1979     Years since quittin.4     Smokeless tobacco: Never Used   Substance Use Topics     Alcohol use: Yes     Alcohol/week: 0.0 - 0.8 standard drinks     Comment: socially         Alcohol Use 2022   Prescreen: >3 drinks/day or >7 drinks/week? No       Current providers sharing in care for this patient include:  Patient Care Team:  Griselda Cedeño MD as PCP - General (Family Medicine)  Fauzia Estrada MD as Assigned PCP    The following health maintenance items are reviewed in Epic and correct as of today:  Health Maintenance Due   Topic Date Due     HEPATITIS C SCREENING  Never done     LIPID  2019     Pneumococcal Vaccine: 65+ Years (1 - PCV) Never done     COVID-19 Vaccine (4 - Booster for Pfizer series) 2022         Any new diagnosis of family breast, ovarian, or bowel cancer? No    FHS-7: No flowsheet data found.      Pertinent mammograms are reviewed under the imaging tab.        Review of Systems  Constitutional, HEENT, cardiovascular, pulmonary, gi and gu systems are negative, except as otherwise noted.    OBJECTIVE:   /62 (BP Location: Right arm, Patient Position: Sitting, Cuff Size: Adult Regular)   Pulse 85   Temp 97.6  F (36.4  C) (Tympanic)   Resp 16   Ht 1.562 m (5' 1.5\")   Wt 59.3 kg (130 lb 12.8 oz)   LMP  (LMP Unknown)   SpO2 100%   BMI 24.31 kg/m   Estimated body mass index is 24.31 kg/m  as calculated from the following:    Height as of this encounter: 1.562 m (5' 1.5\").    Weight as of this encounter: 59.3 kg (130 lb 12.8 oz).  Physical Exam  Constitutional:       Appearance: She is well-developed.   Eyes:      Conjunctiva/sclera: " Conjunctivae normal.   Neck:      Thyroid: No thyromegaly.   Cardiovascular:      Rate and Rhythm: Normal rate and regular rhythm.      Heart sounds: Normal heart sounds. No murmur heard.  Pulmonary:      Effort: Pulmonary effort is normal. No respiratory distress.      Breath sounds: Normal breath sounds.   Abdominal:      Palpations: Abdomen is soft.   Genitourinary:     Comments: Atrophic external genitalia without plaques or lesions.   Lymphadenopathy:      Cervical: No cervical adenopathy.   Skin:     Findings: No rash.      Comments: Jason appearance of skin with diffuse melanocytes, no AKs or other concerning skin lesions identified.    Neurological:      Mental Status: She is alert and oriented to person, place, and time.         ASSESSMENT / PLAN:       ICD-10-CM    1. Health care maintenance  Z00.00    2. Squamous cell carcinoma of scalp  C44.42    3. Vaginal atrophy  N95.2 estradiol (ESTRACE) 0.1 MG/GM vaginal cream   4. Osteoporosis, unspecified osteoporosis type, unspecified pathological fracture presence  M81.0 DX Hip/Pelvis/Spine     Vitamin D Total     PTH, Intact     Ionized Calcium     Ionized Calcium     PTH, Intact     Vitamin D Total   5. Encounter for screening mammogram for breast cancer  Z12.31 MA Screen Bilateral w/Randy   6. Dyslipidemia  E78.5 Lipid Panel     Lipid Panel   7. IFG (impaired fasting glucose)  R73.01 Hemoglobin A1c     Hemoglobin A1c   8. Need for hepatitis C screening test  Z11.59 Hepatitis C Screen Reflex to HCV RNA Quant and Genotype     Hepatitis C Screen Reflex to HCV RNA Quant and Genotype   9. Mixed hearing loss, bilateral  H90.6 Adult Audiology  Referral       Labs today.   Return for mammogram/DEXA.  Patient states she is unwilling to pursue treatment for osteoporosis, but will still obtain DEXA to ensure stability in findings.   Labs to further evaluate secondary causes of osteoporosis.   Referred to audiology.   Trial of vaginal estrogen. Start with cream,  "discussed other vehicles.       COUNSELING:  Reviewed preventive health counseling, as reflected in patient instructions    Estimated body mass index is 24.31 kg/m  as calculated from the following:    Height as of this encounter: 1.562 m (5' 1.5\").    Weight as of this encounter: 59.3 kg (130 lb 12.8 oz).        She reports that she quit smoking about 43 years ago. She has never used smokeless tobacco.      Appropriate preventive services were discussed with this patient, including applicable screening as appropriate for cardiovascular disease, diabetes, osteopenia/osteoporosis, and glaucoma.  As appropriate for age/gender, discussed screening for colorectal cancer, prostate cancer, breast cancer, and cervical cancer. Checklist reviewing preventive services available has been given to the patient.    Reviewed patients plan of care and provided an AVS. The Basic Care Plan (routine screening as documented in Health Maintenance) for Jenae meets the Care Plan requirement. This Care Plan has been established and reviewed with the Patient.    Counseling Resources:  ATP IV Guidelines  Pooled Cohorts Equation Calculator  Breast Cancer Risk Calculator  Breast Cancer: Medication to Reduce Risk  FRAX Risk Assessment  ICSI Preventive Guidelines  Dietary Guidelines for Americans, 2010  USDA's MyPlate  ASA Prophylaxis  Lung CA Screening    Griselda Cedeño MD  [facility] AND [facility]    Identified Health Risks:  "

## 2022-05-25 NOTE — NURSING NOTE
"Chief Complaint   Patient presents with     Physical     Yearly physical        Initial /62 (BP Location: Right arm, Patient Position: Sitting, Cuff Size: Adult Regular)   Pulse 85   Temp 97.6  F (36.4  C) (Tympanic)   Resp 16   Ht 1.562 m (5' 1.5\")   Wt 59.3 kg (130 lb 12.8 oz)   LMP  (LMP Unknown)   SpO2 100%   BMI 24.31 kg/m   Estimated body mass index is 24.31 kg/m  as calculated from the following:    Height as of this encounter: 1.562 m (5' 1.5\").    Weight as of this encounter: 59.3 kg (130 lb 12.8 oz).  Medication Reconciliation: complete    Lupe Swanson LPN    Advance Care Directive reviewed    "

## 2022-05-26 LAB — HCV AB SERPL QL IA: NONREACTIVE

## 2022-06-01 LAB — HBA1C MFR BLD: 5.4 % (ref 0–5.6)

## 2022-06-17 ENCOUNTER — TRANSFERRED RECORDS (OUTPATIENT)
Dept: HEALTH INFORMATION MANAGEMENT | Facility: OTHER | Age: 67
End: 2022-06-17

## 2022-06-28 ENCOUNTER — HOSPITAL ENCOUNTER (OUTPATIENT)
Dept: BONE DENSITY | Facility: OTHER | Age: 67
Discharge: HOME | End: 2022-06-28
Attending: FAMILY MEDICINE
Payer: COMMERCIAL

## 2022-06-28 ENCOUNTER — HOSPITAL ENCOUNTER (OUTPATIENT)
Dept: MAMMOGRAPHY | Facility: OTHER | Age: 67
Discharge: HOME | End: 2022-06-28
Attending: FAMILY MEDICINE
Payer: COMMERCIAL

## 2022-06-28 DIAGNOSIS — M81.0 OSTEOPOROSIS, UNSPECIFIED OSTEOPOROSIS TYPE, UNSPECIFIED PATHOLOGICAL FRACTURE PRESENCE: ICD-10-CM

## 2022-06-28 DIAGNOSIS — Z12.31 ENCOUNTER FOR SCREENING MAMMOGRAM FOR BREAST CANCER: ICD-10-CM

## 2022-06-28 PROCEDURE — 77067 SCR MAMMO BI INCL CAD: CPT

## 2022-06-28 PROCEDURE — 77080 DXA BONE DENSITY AXIAL: CPT

## 2023-05-31 ASSESSMENT — ENCOUNTER SYMPTOMS
SHORTNESS OF BREATH: 0
ABDOMINAL PAIN: 0
FREQUENCY: 0
MYALGIAS: 0
CHILLS: 0
ARTHRALGIAS: 0
HEARTBURN: 0
NERVOUS/ANXIOUS: 0
JOINT SWELLING: 0
HEMATURIA: 0
NAUSEA: 0
DIZZINESS: 0
PALPITATIONS: 0
COUGH: 0
BREAST MASS: 0
HEADACHES: 0
PARESTHESIAS: 0
FEVER: 0
HEMATOCHEZIA: 0
WEAKNESS: 0
EYE PAIN: 0
CONSTIPATION: 0
DIARRHEA: 0
DYSURIA: 0
SORE THROAT: 0

## 2023-05-31 ASSESSMENT — ACTIVITIES OF DAILY LIVING (ADL): CURRENT_FUNCTION: NO ASSISTANCE NEEDED

## 2023-06-07 ENCOUNTER — OFFICE VISIT (OUTPATIENT)
Dept: FAMILY MEDICINE | Facility: OTHER | Age: 68
End: 2023-06-07
Attending: FAMILY MEDICINE
Payer: COMMERCIAL

## 2023-06-07 VITALS
HEART RATE: 73 BPM | TEMPERATURE: 97.5 F | BODY MASS INDEX: 25.1 KG/M2 | DIASTOLIC BLOOD PRESSURE: 64 MMHG | RESPIRATION RATE: 16 BRPM | HEIGHT: 62 IN | WEIGHT: 136.4 LBS | OXYGEN SATURATION: 94 % | SYSTOLIC BLOOD PRESSURE: 122 MMHG

## 2023-06-07 DIAGNOSIS — Z12.31 ENCOUNTER FOR SCREENING MAMMOGRAM FOR BREAST CANCER: ICD-10-CM

## 2023-06-07 DIAGNOSIS — N95.2 VAGINAL ATROPHY: Primary | ICD-10-CM

## 2023-06-07 DIAGNOSIS — M81.0 OSTEOPOROSIS, UNSPECIFIED OSTEOPOROSIS TYPE, UNSPECIFIED PATHOLOGICAL FRACTURE PRESENCE: ICD-10-CM

## 2023-06-07 DIAGNOSIS — C44.42 SQUAMOUS CELL CARCINOMA OF SCALP: ICD-10-CM

## 2023-06-07 PROCEDURE — G0439 PPPS, SUBSEQ VISIT: HCPCS | Performed by: FAMILY MEDICINE

## 2023-06-07 ASSESSMENT — PAIN SCALES - GENERAL: PAINLEVEL: NO PAIN (0)

## 2023-06-07 ASSESSMENT — ACTIVITIES OF DAILY LIVING (ADL): CURRENT_FUNCTION: NO ASSISTANCE NEEDED

## 2023-06-07 NOTE — NURSING NOTE
"Chief Complaint   Patient presents with     Wellness Visit     Yearly visit       Initial /64 (BP Location: Right arm, Patient Position: Sitting, Cuff Size: Adult Regular)   Pulse 73   Temp 97.5  F (36.4  C) (Tympanic)   Resp 16   Ht 1.562 m (5' 1.5\")   Wt 61.9 kg (136 lb 6.4 oz)   LMP  (LMP Unknown)   SpO2 94%   BMI 25.36 kg/m   Estimated body mass index is 25.36 kg/m  as calculated from the following:    Height as of this encounter: 1.562 m (5' 1.5\").    Weight as of this encounter: 61.9 kg (136 lb 6.4 oz).  Medication Reconciliation: complete    Lupe Swanson LPN    Advance Care Directive reviewed    "

## 2023-06-07 NOTE — PROGRESS NOTES
"SUBJECTIVE:   Jenae is a 68 year old who presents for Preventive Visit.      6/7/2023     8:53 AM   Additional Questions   Roomed by Lupe   Accompanied by self     Are you in the first 12 months of your Medicare coverage?  No    Healthy Habits:     In general, how would you rate your overall health?  Excellent    Frequency of exercise:  2-3 days/week    Duration of exercise:  45-60 minutes    Do you usually eat at least 4 servings of fruit and vegetables a day, include whole grains    & fiber and avoid regularly eating high fat or \"junk\" foods?  Yes    Taking medications regularly:  Yes    Medication side effects:  Not applicable    Ability to successfully perform activities of daily living:  No assistance needed    Home Safety:  No safety concerns identified    Hearing Impairment:  No hearing concerns    In the past 6 months, have you been bothered by leaking of urine?  No    In general, how would you rate your overall mental or emotional health?  Excellent      PHQ-2 Total Score: 0    Additional concerns today:  No    The 10-year ASCVD risk score (Juli WHITING, et al., 2019) is: 6.5%    Values used to calculate the score:      Age: 68 years      Sex: Female      Is Non- : No      Diabetic: No      Tobacco smoker: No      Systolic Blood Pressure: 122 mmHg      Is BP treated: No      HDL Cholesterol: 84 mg/dL      Total Cholesterol: 224 mg/dL      Have you ever done Advance Care Planning? (For example, a Health Directive, POLST, or a discussion with a medical provider or your loved ones about your wishes): Yes, patient states has an Advance Care Planning document and will bring a copy to the clinic.       Fall risk  Fallen 2 or more times in the past year?: No  Any fall with injury in the past year?: No    Cognitive Screening   1) Repeat 3 items (Leader, Season, Table)    2) Clock draw: NORMAL  3) 3 item recall: Recalls 3 objects  Results: 3 items recalled: COGNITIVE IMPAIRMENT LESS " LIKELY    Mini-CogTM Copyright JAVID Boss. Licensed by the author for use in [facility]; reprinted with permission (brooke@[facility]). All rights reserved.      Do you have sleep apnea, excessive snoring or daytime drowsiness?: Sleep issues not sleeping well.    Reviewed and updated as needed this visit by clinical staff   Tobacco  Allergies  Meds              Reviewed and updated as needed this visit by Provider                 Social History     Tobacco Use     Smoking status: Former     Types: Cigarettes     Quit date: 1979     Years since quittin.4     Smokeless tobacco: Never   Vaping Use     Vaping status: Never Used   Substance Use Topics     Alcohol use: Yes     Alcohol/week: 0.0 - 0.8 standard drinks of alcohol     Comment: socially           2023     9:05 AM   Alcohol Use   Prescreen: >3 drinks/day or >7 drinks/week? No     Do you have a current opioid prescription? No  Do you use any other controlled substances or medications that are not prescribed by a provider? None      Current providers sharing in care for this patient include:   Patient Care Team:  Griselda Cedeño MD as PCP - General (Family Medicine)  Griselda Cedeño MD as Assigned PCP    The following health maintenance items are reviewed in Epic and correct as of today:  Health Maintenance   Topic Date Due     COVID-19 Vaccine (4 - Pfizer series) 2021     MEDICARE ANNUAL WELLNESS VISIT  2023     COLORECTAL CANCER SCREENING  2023     FALL RISK ASSESSMENT  2024     MAMMO SCREENING  2024     LIPID  2027     ADVANCE CARE PLANNING  2028     DTAP/TDAP/TD IMMUNIZATION (3 - Td or Tdap) 2029     DEXA  2037     HEPATITIS C SCREENING  Completed     PHQ-2 (once per calendar year)  Completed     INFLUENZA VACCINE  Completed     ZOSTER IMMUNIZATION  Completed     IPV IMMUNIZATION  Aged Out     MENINGITIS IMMUNIZATION  Aged Out     Pneumococcal Vaccine: 65+ Years  Discontinued  "            5/31/2023     9:05 AM   Breast CA Risk Assessment (FHS-7)   Do you have a family history of breast, colon, or ovarian cancer? No / Unknown       Pertinent mammograms are reviewed under the imaging tab.    Review of Systems  Constitutional, HEENT, cardiovascular, pulmonary, gi and gu systems are negative, except as otherwise noted.    OBJECTIVE:   /64 (BP Location: Right arm, Patient Position: Sitting, Cuff Size: Adult Regular)   Pulse 73   Temp 97.5  F (36.4  C) (Tympanic)   Resp 16   Ht 1.562 m (5' 1.5\")   Wt 61.9 kg (136 lb 6.4 oz)   LMP  (LMP Unknown)   SpO2 94%   BMI 25.36 kg/m   Estimated body mass index is 25.36 kg/m  as calculated from the following:    Height as of this encounter: 1.562 m (5' 1.5\").    Weight as of this encounter: 61.9 kg (136 lb 6.4 oz).  Physical Exam  Constitutional:       Appearance: She is well-developed.   Eyes:      Conjunctiva/sclera: Conjunctivae normal.   Neck:      Thyroid: No thyromegaly.   Cardiovascular:      Rate and Rhythm: Normal rate and regular rhythm.      Heart sounds: Normal heart sounds. No murmur heard.  Pulmonary:      Effort: Pulmonary effort is normal. No respiratory distress.      Breath sounds: Normal breath sounds.   Abdominal:      Palpations: Abdomen is soft.   Lymphadenopathy:      Cervical: No cervical adenopathy.   Skin:     Findings: No rash.   Neurological:      Mental Status: She is alert and oriented to person, place, and time.         Diagnostic Test Results:  Labs reviewed in Epic    ASSESSMENT / PLAN:       ICD-10-CM    1. Vaginal atrophy  N95.2 conjugated estrogens (PREMARIN) 0.625 MG/GM vaginal cream      2. Encounter for screening mammogram for breast cancer  Z12.31 MA Screen Bilateral w/Randy      3. Squamous cell carcinoma of scalp  C44.42       4. Osteoporosis, unspecified osteoporosis type, unspecified pathological fracture presence  M81.0         Patient would like to try topical premarin. Consider Estring which " "appears to be covered by her insurance.     Reviewed blood work from last year, not planning to repeat this year.  Overall cholesterol is reassuring.  Could consider CT calcium score for further restratification if needed in the future.    Mammogram later this month.    Reviewed bone density scan done last year.  FRAX score would indicate need for treatment.  Patient is not interested in treatment at this time.  Will reconsider depending on progression over the next few years.  She continues to walk regularly, take calcium and vitamin D supplementation.  Work-up for secondary causes was unremarkable.    Vaccines are all up-to-date.  Some of these were done in [place] so we do not have record of them.  Consider COVID booster next fall.      COUNSELING:  Reviewed preventive health counseling, as reflected in patient instructions      BMI:   Estimated body mass index is 25.36 kg/m  as calculated from the following:    Height as of this encounter: 1.562 m (5' 1.5\").    Weight as of this encounter: 61.9 kg (136 lb 6.4 oz).         She reports that she quit smoking about 44 years ago. She has never used smokeless tobacco.      Appropriate preventive services were discussed with this patient, including applicable screening as appropriate for cardiovascular disease, diabetes, osteopenia/osteoporosis, and glaucoma.  As appropriate for age/gender, discussed screening for colorectal cancer, prostate cancer, breast cancer, and cervical cancer. Checklist reviewing preventive services available has been given to the patient.    Reviewed patients plan of care and provided an AVS. The Basic Care Plan (routine screening as documented in Health Maintenance) for Jenae meets the Care Plan requirement. This Care Plan has been established and reviewed with the Patient.    Griselda Cedeño MD  [facility]    Identified Health Risks:    I have reviewed Opioid Use Disorder and Substance Use Disorder risk factors and made " any needed referrals.   Review current opioid prescriptions    For a patient with a current opioid prescription:    Reviewed potential Opioid Use Disorder (OUD) risk factors: Yes n/a    Evaluate their pain severity and current treatment plan:     Provide information on non-opioid treatment options:    Refer to a specialist, as appropriate:    Get more information on pain management in the [facility] Pain Management Best Practices Inter-agency Task Force Report    Screen for potential Substance Use Disorders (SUDs)    Reviewed the patient s potential risk factors for SUDs: Yes n/a    Refer to treatment or specialist, as appropriate:     A screening tool isn t required but you may use one:  Find more information in the National [place] on Drug Abuse Screening and Assessment Tools Chart

## 2023-07-31 ENCOUNTER — OFFICE VISIT (OUTPATIENT)
Dept: FAMILY MEDICINE | Facility: OTHER | Age: 68
End: 2023-07-31
Attending: STUDENT IN AN ORGANIZED HEALTH CARE EDUCATION/TRAINING PROGRAM
Payer: COMMERCIAL

## 2023-07-31 ENCOUNTER — MYC MEDICAL ADVICE (OUTPATIENT)
Dept: FAMILY MEDICINE | Facility: OTHER | Age: 68
End: 2023-07-31

## 2023-07-31 ENCOUNTER — HOSPITAL ENCOUNTER (OUTPATIENT)
Dept: GENERAL RADIOLOGY | Facility: OTHER | Age: 68
Discharge: HOME | End: 2023-07-31
Attending: STUDENT IN AN ORGANIZED HEALTH CARE EDUCATION/TRAINING PROGRAM
Payer: COMMERCIAL

## 2023-07-31 VITALS
WEIGHT: 136.38 LBS | OXYGEN SATURATION: 99 % | HEIGHT: 62 IN | RESPIRATION RATE: 16 BRPM | BODY MASS INDEX: 25.1 KG/M2 | TEMPERATURE: 98 F | SYSTOLIC BLOOD PRESSURE: 138 MMHG | HEART RATE: 81 BPM | DIASTOLIC BLOOD PRESSURE: 86 MMHG

## 2023-07-31 DIAGNOSIS — M25.511 ACUTE PAIN OF RIGHT SHOULDER: ICD-10-CM

## 2023-07-31 DIAGNOSIS — M25.511 ACUTE PAIN OF RIGHT SHOULDER: Primary | ICD-10-CM

## 2023-07-31 DIAGNOSIS — F40.240 CLAUSTROPHOBIA: ICD-10-CM

## 2023-07-31 PROCEDURE — G0463 HOSPITAL OUTPT CLINIC VISIT: HCPCS

## 2023-07-31 PROCEDURE — 73030 X-RAY EXAM OF SHOULDER: CPT | Mod: RT

## 2023-07-31 PROCEDURE — 99213 OFFICE O/P EST LOW 20 MIN: CPT | Performed by: STUDENT IN AN ORGANIZED HEALTH CARE EDUCATION/TRAINING PROGRAM

## 2023-07-31 PROCEDURE — G0463 HOSPITAL OUTPT CLINIC VISIT: HCPCS | Mod: 25

## 2023-07-31 PROCEDURE — 73060 X-RAY EXAM OF HUMERUS: CPT | Mod: RT

## 2023-07-31 ASSESSMENT — PAIN SCALES - GENERAL: PAINLEVEL: WORST PAIN (10)

## 2023-07-31 NOTE — NURSING NOTE
Patient presents to clinic after fall x 3 weeks ago.  She is experiencing right shoulder burning pain rated 10 and is unable to raise arm or straighten it.    Medication Rec Complete  Nati Roe LPN............7/31/2023 1:25 PM

## 2023-07-31 NOTE — PROGRESS NOTES
"  Assessment & Plan     Acute pain of right shoulder  Xray negative for fracture. Could be rotator cuff pathology vs bruise vs strain. She would like to try PT before further work up which I think is appropriate. Given sling today for comfort, continue supportive cares at home   - XR Shoulder Right 2 Views; Future  - XR Humerus Right G/E 2 Views; Future  - Neck/Shoulder/Back Order Sling; Right  - Physical Therapy Referral; Future                 No follow-ups on file.    Jerman Bañuelos MD  [facility]    Subjective   Jenae is a 68 year old, presenting for the following health issues:  Fall (Right shoulder pain rated 10, unable to lift arm or straighten )      Fall    History of Present Illness       Reason for visit:  Right arm pain  Symptom onset:  3-4 weeks ago  Symptoms include:  Severe pain in right shoulder  Symptom intensity:  Severe  Symptom progression:  Staying the same  Had these symptoms before:  No  What makes it better:  CBD Cream    She eats 2-3 servings of fruits and vegetables daily.She consumes 0 sweetened beverage(s) daily.She exercises with enough effort to increase her heart rate 10 to 19 minutes per day.  She exercises with enough effort to increase her heart rate 3 or less days per week.   She is taking medications regularly.     Right shoulder pain   - fell and landed on dock board on shoulder and upper arm  - worsening pain   - using CBD cream, OTC analgesics   - limited ROM             Review of Systems   Constitutional, HEENT, cardiovascular, pulmonary, gi and gu systems are negative, except as otherwise noted.      Objective    /86 (BP Location: Left arm, Patient Position: Sitting, Cuff Size: Adult Regular)   Pulse 81   Temp 98  F (36.7  C) (Tympanic)   Resp 16   Ht 1.562 m (5' 1.5\")   Wt 61.9 kg (136 lb 6 oz)   LMP  (LMP Unknown)   SpO2 99%   BMI 25.35 kg/m    Body mass index is 25.35 kg/m .  Physical Exam   GENERAL: healthy, alert and no " distress  Neck: Cervical spine non-tender to palpation. Full ROM. Negative Spurlings.  RIGHT Shoulder: skin without erythema, swelling or ecchymosis. No atrophy noted.diffusely tender to palpation over the SC joint, clavicle, AC joint, or bicipital groove. Pain with forward flexion, extenion abduction both passive and active. CMS intact.      XR Shoulder Right 2 Views    Result Date: 7/31/2023  PROCEDURE: XR SHOULDER RIGHT 2 VIEWS, XR HUMERUS RIGHT G/E 2 VIEWS 7/31/2023 1:52 PM HISTORY: Acute pain of right shoulder COMPARISONS: None. TECHNIQUE: 2 views each of shoulder and humerus. FINDINGS: No fracture or dislocation is seen. There is no focal bone lesion. There is generalized osteopenia.        IMPRESSION: No acute fracture. FANTASMA MCKEON MD   SYSTEM ID:  [id number]    XR Humerus Right G/E 2 Views    Result Date: 7/31/2023  PROCEDURE: XR SHOULDER RIGHT 2 VIEWS, XR HUMERUS RIGHT G/E 2 VIEWS 7/31/2023 1:52 PM HISTORY: Acute pain of right shoulder COMPARISONS: None. TECHNIQUE: 2 views each of shoulder and humerus. FINDINGS: No fracture or dislocation is seen. There is no focal bone lesion. There is generalized osteopenia.        IMPRESSION: No acute fracture. FANTASMA MCKEON MD   SYSTEM ID:  [id number]                 DME (Durable Medical Equipment) Orders and Documentation  Orders Placed This Encounter   Procedures    Neck/Shoulder/Back Order Sling; Right        The patient was assessed and it was determined the patient is in need of the following listed DME Supplies/Equipment. Please complete supporting documentation below to demonstrate medical necessity.

## 2023-08-02 RX ORDER — ALPRAZOLAM 0.25 MG
0.25 TABLET ORAL 2 TIMES DAILY PRN
Qty: 2 TABLET | Refills: 0 | Status: SHIPPED | OUTPATIENT
Start: 2023-08-02

## 2023-08-03 ENCOUNTER — TRANSFERRED RECORDS (OUTPATIENT)
Dept: HEALTH INFORMATION MANAGEMENT | Facility: OTHER | Age: 68
End: 2023-08-03
Payer: COMMERCIAL

## 2023-08-07 ENCOUNTER — MYC MEDICAL ADVICE (OUTPATIENT)
Dept: FAMILY MEDICINE | Facility: OTHER | Age: 68
End: 2023-08-07
Payer: COMMERCIAL

## 2023-08-08 NOTE — TELEPHONE ENCOUNTER
Spoke with [facility] and they stated to send a new referral.    Katherine Pina RN on 8/8/2023 at 3:31 PM

## 2023-08-08 NOTE — TELEPHONE ENCOUNTER
Contacted [facility] and they do not have a referral, said the current order will work and they do not need a new one.    Katherine Pina RN on 8/8/2023 at 3:38 PM

## 2023-08-28 ENCOUNTER — TRANSFERRED RECORDS (OUTPATIENT)
Dept: HEALTH INFORMATION MANAGEMENT | Facility: OTHER | Age: 68
End: 2023-08-28
Payer: COMMERCIAL

## 2023-08-30 ENCOUNTER — MYC MEDICAL ADVICE (OUTPATIENT)
Dept: FAMILY MEDICINE | Facility: OTHER | Age: 68
End: 2023-08-30
Payer: COMMERCIAL

## 2023-08-30 DIAGNOSIS — M25.511 ACUTE PAIN OF RIGHT SHOULDER: Primary | ICD-10-CM

## 2023-09-13 ENCOUNTER — TRANSFERRED RECORDS (OUTPATIENT)
Dept: HEALTH INFORMATION MANAGEMENT | Facility: OTHER | Age: 68
End: 2023-09-13
Payer: COMMERCIAL

## 2023-11-30 ENCOUNTER — MYC MEDICAL ADVICE (OUTPATIENT)
Dept: FAMILY MEDICINE | Facility: OTHER | Age: 68
End: 2023-11-30
Payer: COMMERCIAL

## 2023-11-30 DIAGNOSIS — Z12.11 SCREENING FOR MALIGNANT NEOPLASM OF COLON: Primary | ICD-10-CM

## 2023-12-20 LAB — NONINV COLON CA DNA+OCC BLD SCRN STL QL: NEGATIVE

## 2024-02-19 NOTE — PROGRESS NOTES
Assessment & Plan     1. Acute gastritis without hemorrhage, unspecified gastritis type  Symptoms and exam most consistent with GERD with progression to gastritis.  Recommend symptomatic management with omeprazole 20 mg once daily as well as sucralfate for 4 to 6 weeks.  Avoid triggering foods, try to focus on bland diet.  Increase hydration.  If symptoms persist or worsen can pursue additional evaluation with lab work, possible imaging.  - sucralfate (CARAFATE) 1 GM tablet; Take 1 tablet (1 g) by mouth 4 times daily as needed for nausea  Dispense: 60 tablet; Refill: 1      No follow-ups on file.    Laurie Emanuel is a 69 year old, presenting for the following health issues:  Gastrointestinal Problem    History of Present Illness       Reason for visit:  Heartburn, gas and upset stomach  Symptom onset:  More than a month  Symptoms include:  Heartburn, gas and upset stomach  Symptom intensity:  Moderate  Symptom progression:  Worsening  Had these symptoms before:  Yes  What makes it worse:  Pizza and spicy foods  What makes it better:  Tums    She eats 0-1 servings of fruits and vegetables daily.She consumes 0 sweetened beverage(s) daily.She exercises with enough effort to increase her heart rate 20 to 29 minutes per day.  She exercises with enough effort to increase her heart rate 3 or less days per week.   She is taking medications regularly.    Here for evaluation of GI concerns.  Patient reports few month history of heartburn, reflux, epigastric pain, nausea, bloating.  More recently associated constipation.  Symptoms seem to be progressive.  Has tried Activia, Prilosec, Gas-X, Tums with minimal improvement.  Reports when she was on the Prilosec for approximately 2 weeks she did notice some improvement but then she stopped as the over-the-counter box had not been on it for greater than 14 days at a time.  Does notice that certain foods make it worse.  No associated fever/chills, vomiting, diarrhea, blood in  stool, urinary symptoms.  No significant NSAID use.    PAST MEDICAL HISTORY:   Past Medical History:   Diagnosis Date    Asymptomatic menopausal state     No Comments Provided    Gastro-esophageal reflux disease without esophagitis     No Comments Provided    Palpitations     No Comments Provided    Personal history of other mental and behavioral disorders     off meds for years    Polyp of corpus uteri     11    Tear of left supraspinatus tendon 10/5/2020       PAST SURGICAL HISTORY:   Past Surgical History:   Procedure Laterality Date     SECTION      No Comments Provided    COLONOSCOPY      2007,normal;  Dr. Go outside facility    EXCISE CYST GENERIC (LOCATION)      ,endocervial region polyp    OTHER SURGICAL HISTORY      89343.0,SKIN/SUBCUTANEOUS SURGERY,3 birthmarks removed from neck.    OTHER SURGICAL HISTORY      [id number],SKIN BIOPSY,removal in [place]       FAMILY HISTORY:   Family History   Problem Relation Age of Onset    Other - See Comments Mother 70        Parkinson's/Psychiatric illness,Depression    Alzheimer Disease Mother     Cancer Father 37        Cancer,leukemia,     Alcoholism Sister          age 57    Liver Disease Sister     No Known Problems Sister     Other - See Comments Brother 50        Parkinnson's    No Known Problems Brother     No Known Problems Daughter     No Known Problems Son     No Known Problems Son     Breast Cancer No family hx of         Cancer-breast       SOCIAL HISTORY:   Social History     Tobacco Use    Smoking status: Former     Types: Cigarettes     Quit date: 1979     Years since quittin.1    Smokeless tobacco: Never   Substance Use Topics    Alcohol use: Yes     Alcohol/week: 0.0 - 0.8 standard drinks of alcohol     Comment: socially        Allergies   Allergen Reactions    No Clinical Screening - See Comments      Decongestants  Dial soap-hives     Current Outpatient Medications   Medication    aspirin-acetaminophen-caffeine  "(EXCEDRIN MIGRAINE) 250-250-65 MG per tablet    conjugated estrogens (PREMARIN) 0.625 MG/GM vaginal cream    sucralfate (CARAFATE) 1 GM tablet    ALPRAZolam (XANAX) 0.25 MG tablet     No current facility-administered medications for this visit.       Review of Systems  Per HPI        Objective    /76   Pulse 76   Temp 97.1  F (36.2  C)   Resp 14   Ht 1.562 m (5' 1.5\")   Wt 62.7 kg (138 lb 3.2 oz)   LMP  (LMP Unknown)   SpO2 98%   BMI 25.69 kg/m    Body mass index is 25.69 kg/m .  Physical Exam   General: Pleasant, in no apparent distress.  Eyes: Sclera are white and conjunctiva are clear bilaterally. Lacrimal apparatus free of erythema, edema, and discharge bilaterally.  Ears: External ears without erythema or edema.   Cardiovascular: Regular rate and rhythm with S1 equal to S2. No murmurs, friction rubs, or gallops.   Respiratory: Lungs are resonant and clear to auscultation bilaterally. No wheezes, crackles, or rhonchi.  Abdomen: Abdomen is non-distended. Active bowel sounds heard in all four quadrants. No tenderness to palpation in all four quadrants. No rebound tenderness or guarding. No palpable masses noted. No hepatosplenomegaly.  Skin: No jaundice, pallor, rashes, or lesions.  Psych: Appropriate mood and affect.      Signed Electronically by: Gerri Walker PA-C    "

## 2024-02-21 ENCOUNTER — OFFICE VISIT (OUTPATIENT)
Dept: FAMILY MEDICINE | Facility: OTHER | Age: 69
End: 2024-02-21
Attending: PHYSICIAN ASSISTANT
Payer: COMMERCIAL

## 2024-02-21 VITALS
WEIGHT: 138.2 LBS | OXYGEN SATURATION: 98 % | TEMPERATURE: 97.1 F | HEIGHT: 62 IN | SYSTOLIC BLOOD PRESSURE: 126 MMHG | RESPIRATION RATE: 14 BRPM | BODY MASS INDEX: 25.43 KG/M2 | HEART RATE: 76 BPM | DIASTOLIC BLOOD PRESSURE: 76 MMHG

## 2024-02-21 DIAGNOSIS — K29.00 ACUTE GASTRITIS WITHOUT HEMORRHAGE, UNSPECIFIED GASTRITIS TYPE: Primary | ICD-10-CM

## 2024-02-21 PROCEDURE — 99213 OFFICE O/P EST LOW 20 MIN: CPT | Performed by: PHYSICIAN ASSISTANT

## 2024-02-21 PROCEDURE — G0463 HOSPITAL OUTPT CLINIC VISIT: HCPCS

## 2024-02-21 RX ORDER — SUCRALFATE 1 G/1
1 TABLET ORAL 4 TIMES DAILY PRN
Qty: 60 TABLET | Refills: 1 | Status: SHIPPED | OUTPATIENT
Start: 2024-02-21

## 2024-02-21 ASSESSMENT — PAIN SCALES - GENERAL: PAINLEVEL: MILD PAIN (2)

## 2024-02-21 NOTE — NURSING NOTE
Patient presents to clinic with heartburn and gi issues. Nausea, and gas. She states that this started a few months ago.  Deidre Weber LPN ....................  2/21/2024   9:14 AM  EXT [id number]

## 2024-05-01 ENCOUNTER — HOSPITAL ENCOUNTER (OUTPATIENT)
Dept: MAMMOGRAPHY | Facility: OTHER | Age: 69
Discharge: HOME | End: 2024-05-01
Attending: FAMILY MEDICINE | Admitting: FAMILY MEDICINE
Payer: COMMERCIAL

## 2024-05-01 DIAGNOSIS — Z12.31 VISIT FOR SCREENING MAMMOGRAM: ICD-10-CM

## 2024-05-01 PROCEDURE — 77063 BREAST TOMOSYNTHESIS BI: CPT

## 2024-06-05 ENCOUNTER — OFFICE VISIT (OUTPATIENT)
Dept: FAMILY MEDICINE | Facility: OTHER | Age: 69
End: 2024-06-05
Attending: FAMILY MEDICINE
Payer: COMMERCIAL

## 2024-06-05 VITALS
OXYGEN SATURATION: 99 % | HEART RATE: 77 BPM | SYSTOLIC BLOOD PRESSURE: 128 MMHG | DIASTOLIC BLOOD PRESSURE: 80 MMHG | RESPIRATION RATE: 18 BRPM | TEMPERATURE: 97.9 F | BODY MASS INDEX: 25.76 KG/M2 | WEIGHT: 138.6 LBS

## 2024-06-05 DIAGNOSIS — Z00.00 HEALTH CARE MAINTENANCE: ICD-10-CM

## 2024-06-05 DIAGNOSIS — N95.2 VAGINAL ATROPHY: ICD-10-CM

## 2024-06-05 DIAGNOSIS — K21.00 GASTROESOPHAGEAL REFLUX DISEASE WITH ESOPHAGITIS WITHOUT HEMORRHAGE: Primary | ICD-10-CM

## 2024-06-05 PROCEDURE — 99214 OFFICE O/P EST MOD 30 MIN: CPT | Performed by: FAMILY MEDICINE

## 2024-06-05 PROCEDURE — G0463 HOSPITAL OUTPT CLINIC VISIT: HCPCS

## 2024-06-05 ASSESSMENT — PAIN SCALES - GENERAL: PAINLEVEL: NO PAIN (0)

## 2024-06-05 NOTE — NURSING NOTE
"Chief Complaint   Patient presents with    Recheck Medication     Medication management        Initial /80 (BP Location: Right arm, Patient Position: Sitting, Cuff Size: Adult Regular)   Pulse 77   Temp 97.9  F (36.6  C) (Tympanic)   Resp 18   Wt 62.9 kg (138 lb 9.6 oz)   LMP  (LMP Unknown)   SpO2 99%   BMI 25.76 kg/m   Estimated body mass index is 25.76 kg/m  as calculated from the following:    Height as of 2/21/24: 1.562 m (5' 1.5\").    Weight as of this encounter: 62.9 kg (138 lb 9.6 oz).  Medication Reconciliation: complete    Lupe Swanson LPN    Advance Care Directive reviewed    "

## 2024-06-05 NOTE — PROGRESS NOTES
"  Assessment & Plan       ICD-10-CM    1. Gastroesophageal reflux disease with esophagitis without hemorrhage  K21.00 omeprazole (PRILOSEC) 20 MG DR capsule      2. Vaginal atrophy  N95.2 conjugated estrogens (PREMARIN) 0.625 MG/GM vaginal cream          Medications reviewed and refilled.  Patient will continue on PPI for now, did discuss further changes she may want to consider.    Is doing well with topical estrogen and would like to continue this for vaginal atrophy.    Declines lab work today.  Otherwise up-to-date on healthcare maintenance recommendations.      BMI  Estimated body mass index is 25.76 kg/m  as calculated from the following:    Height as of 2/21/24: 1.562 m (5' 1.5\").    Weight as of this encounter: 62.9 kg (138 lb 9.6 oz).         No follow-ups on file.    Laurie Emanuel is a 69 year old, presenting for the following health issues:  Recheck Medication (Medication management )        6/5/2024    11:38 AM   Additional Questions   Roomed by Lupe   Accompanied by self     History of Present Illness       Reason for visit:  Medicine refill and heart burn    She eats 2-3 servings of fruits and vegetables daily.She consumes 0 sweetened beverage(s) daily.She exercises with enough effort to increase her heart rate 10 to 19 minutes per day.  She exercises with enough effort to increase her heart rate 3 or less days per week.   She is taking medications regularly.     Patient takes a PPI daily.  She has tried lifestyle changes without success.  She is requesting a prescription sent in to see if this is more affordable.  She was previously provided a prescription for sucralfate but she thought this was for nausea so she did not try it.  Reviewed indications, she likely will give this a try to.      The 10-year ASCVD risk score (Juli WHITING, et al., 2019) is: 8%    Values used to calculate the score:      Age: 69 years      Sex: Female      Is Non- : No      Diabetic: No      " Tobacco smoker: No      Systolic Blood Pressure: 128 mmHg      Is BP treated: No      HDL Cholesterol: 84 mg/dL      Total Cholesterol: 224 mg/dL          Objective    /80 (BP Location: Right arm, Patient Position: Sitting, Cuff Size: Adult Regular)   Pulse 77   Temp 97.9  F (36.6  C) (Tympanic)   Resp 18   Wt 62.9 kg (138 lb 9.6 oz)   LMP  (LMP Unknown)   SpO2 99%   BMI 25.76 kg/m    Body mass index is 25.76 kg/m .  Physical Exam  Constitutional:       Appearance: She is well-developed.   HENT:      Right Ear: External ear normal.      Left Ear: External ear normal.   Eyes:      General: No scleral icterus.     Conjunctiva/sclera: Conjunctivae normal.   Cardiovascular:      Rate and Rhythm: Normal rate.   Pulmonary:      Effort: Pulmonary effort is normal. No respiratory distress.   Skin:     Findings: No rash.   Neurological:      Mental Status: She is alert.                 Signed Electronically by: Griselda Cedeño MD

## 2024-06-20 ENCOUNTER — PATIENT OUTREACH (OUTPATIENT)
Dept: FAMILY MEDICINE | Facility: OTHER | Age: 69
End: 2024-06-20
Payer: COMMERCIAL

## 2024-06-20 NOTE — TELEPHONE ENCOUNTER
Patient Quality Outreach    Patient is due for the following:   Physical Annual Wellness Visit    Next Steps:   Schedule a Annual Wellness Visit    Type of outreach:    Sent letter.      Questions for provider review:    None          Sulma Menchaca

## 2024-07-08 ENCOUNTER — E-VISIT (OUTPATIENT)
Dept: URGENT CARE | Facility: CLINIC | Age: 69
End: 2024-07-08
Payer: COMMERCIAL

## 2024-07-08 DIAGNOSIS — H10.33 ACUTE BACTERIAL CONJUNCTIVITIS OF BOTH EYES: Primary | ICD-10-CM

## 2024-07-08 PROCEDURE — 99421 OL DIG E/M SVC 5-10 MIN: CPT | Performed by: PHYSICIAN ASSISTANT

## 2024-07-08 RX ORDER — POLYMYXIN B SULFATE AND TRIMETHOPRIM 1; 10000 MG/ML; [USP'U]/ML
SOLUTION OPHTHALMIC
Qty: 10 ML | Refills: 0 | Status: SHIPPED | OUTPATIENT
Start: 2024-07-08 | End: 2024-07-15

## 2024-07-08 NOTE — PATIENT INSTRUCTIONS
Thank you for choosing us for your care. I have placed an order for a prescription so that you can start treatment. View your full visit summary for details by clicking on the link below. Your pharmacist will able to address any questions you may have about the medication.     If you re not feeling better within 2-3 days, please schedule an appointment.  You can schedule an appointment right here in [facility], or call [phone]  If the visit is for the same symptoms as your eVisit, we ll refund the cost of your eVisit if seen within seven days.    Pinkeye: Care Instructions  Overview     Pinkeye is redness and swelling of the eye surface and the conjunctiva (the lining of the eyelid and the covering of the white part of the eye). Pinkeye is also called conjunctivitis. Pinkeye is often caused by infection with bacteria or a virus. Dry air, allergies, smoke, and chemicals are other common causes.  Pinkeye often gets better on its own in 7 to 10 days. Antibiotics only help if the pinkeye is caused by bacteria. Pinkeye caused by infection spreads easily. If an allergy or chemical is causing pinkeye, it will not go away unless you can avoid whatever is causing it.  Follow-up care is a key part of your treatment and safety. Be sure to make and go to all appointments, and call your doctor if you are having problems. It's also a good idea to know your test results and keep a list of the medicines you take.  How can you care for yourself at home?  Wash your hands often. Always wash them before and after you treat pinkeye or touch your eyes or face.  Use moist cotton or a clean, wet cloth to remove crust. Wipe from the inside corner of the eye to the outside. Use a clean part of the cloth for each wipe.  Put cold or warm wet cloths on your eye a few times a day if the eye hurts.  Do not wear contact lenses or eye makeup until the pinkeye is gone. Throw away any eye makeup you were using when you got pinkeye. Clean your  "contacts and storage case. If you wear disposable contacts, use a new pair when your eye has cleared and it is safe to wear contacts again.  If the doctor gave you antibiotic ointment or eyedrops, use them as directed. Use the medicine for as long as instructed, even if your eye starts looking better soon. Keep the bottle tip clean, and do not let it touch the eye area.  To put in eyedrops or ointment:  Tilt your head back, and pull your lower eyelid down with one finger.  Drop or squirt the medicine inside the lower lid.  Close your eye for 30 to 60 seconds to let the drops or ointment move around.  Do not touch the ointment or dropper tip to your eyelashes or any other surface.  Do not share towels, pillows, or washcloths while you have pinkeye.  When should you call for help?   Call your doctor now or seek immediate medical care if:    You have pain in your eye, not just irritation on the surface.     You have a change in vision or loss of vision.     You have an increase in discharge from the eye.     Your eye has not started to improve or begins to get worse within 48 hours after you start using antibiotics.     Pinkeye lasts longer than 7 days.   Watch closely for changes in your health, and be sure to contact your doctor if you have any problems.  Where can you learn more?  Go to https://www.[employer]/patiented  Enter Y392 in the search box to learn more about \"Pinkeye: Care Instructions.\"  Current as of: June 5, 2023               Content Version: 14.0    9276-2961 [email or web address].   Care instructions adapted under license by your healthcare professional. If you have questions about a medical condition or this instruction, always ask your healthcare professional. [email or web address] disclaims any warranty or liability for your use of this information.      "

## 2025-05-24 ENCOUNTER — HEALTH MAINTENANCE LETTER (OUTPATIENT)
Age: 70
End: 2025-05-24